# Patient Record
Sex: FEMALE | URBAN - METROPOLITAN AREA
[De-identification: names, ages, dates, MRNs, and addresses within clinical notes are randomized per-mention and may not be internally consistent; named-entity substitution may affect disease eponyms.]

---

## 2018-09-12 ENCOUNTER — IMPORTED ENCOUNTER (OUTPATIENT)
Dept: URBAN - METROPOLITAN AREA CLINIC 31 | Facility: CLINIC | Age: 58
End: 2018-09-12

## 2018-09-12 PROCEDURE — 92015 DETERMINE REFRACTIVE STATE: CPT

## 2018-09-12 PROCEDURE — 92004 COMPRE OPH EXAM NEW PT 1/>: CPT

## 2018-09-12 NOTE — PATIENT DISCUSSION
1.  Refractive error - Change glasses. 2.  Return for an appointment in 1 year for comprehensive exam. with Dr. Ca Lopez.

## 2021-06-28 NOTE — PATIENT DISCUSSION
Will need Ptosis VF and external photos to confirm patient meets medical insurance criteria for blepharoplasty. Patient unbothered a this time, will monitor.

## 2022-04-02 ASSESSMENT — TONOMETRY
OD_IOP_MMHG: 20
OS_IOP_MMHG: 20

## 2022-04-02 ASSESSMENT — VISUAL ACUITY
OD_SC: 20/25-2
OS_SC: 20/20-1

## 2023-04-19 ENCOUNTER — COMPREHENSIVE EXAM (OUTPATIENT)
Dept: URBAN - METROPOLITAN AREA CLINIC 29 | Facility: CLINIC | Age: 63
End: 2023-04-19

## 2023-04-19 DIAGNOSIS — H25.13: ICD-10-CM

## 2023-04-19 DIAGNOSIS — H52.223: ICD-10-CM

## 2023-04-19 DIAGNOSIS — H52.4: ICD-10-CM

## 2023-04-19 DIAGNOSIS — H52.13: ICD-10-CM

## 2023-04-19 PROCEDURE — 92015 DETERMINE REFRACTIVE STATE: CPT

## 2023-04-19 PROCEDURE — 99214 OFFICE O/P EST MOD 30 MIN: CPT

## 2023-04-19 ASSESSMENT — VISUAL ACUITY
OD_CC: 20/20
OS_CC: 20/20-1
OS_CC: 20/20-2
OD_CC: 20/25-2

## 2023-04-19 ASSESSMENT — TONOMETRY
OS_IOP_MMHG: 18
OD_IOP_MMHG: 18

## 2025-07-18 ENCOUNTER — PREPPED CHART (OUTPATIENT)
Age: 65
End: 2025-07-18

## 2025-08-08 ENCOUNTER — COMPREHENSIVE EXAM (OUTPATIENT)
Age: 65
End: 2025-08-08

## 2025-08-08 DIAGNOSIS — H52.13: ICD-10-CM

## 2025-08-08 DIAGNOSIS — H52.223: ICD-10-CM

## 2025-08-08 DIAGNOSIS — H52.4: ICD-10-CM

## 2025-08-08 PROCEDURE — 92015 DETERMINE REFRACTIVE STATE: CPT

## 2025-08-08 PROCEDURE — 92014 COMPRE OPH EXAM EST PT 1/>: CPT
